# Patient Record
Sex: MALE | Race: WHITE
[De-identification: names, ages, dates, MRNs, and addresses within clinical notes are randomized per-mention and may not be internally consistent; named-entity substitution may affect disease eponyms.]

---

## 2017-08-20 ENCOUNTER — HOSPITAL ENCOUNTER (EMERGENCY)
Dept: HOSPITAL 25 - UCCORT | Age: 22
Discharge: HOME | End: 2017-08-20
Payer: COMMERCIAL

## 2017-08-20 VITALS — SYSTOLIC BLOOD PRESSURE: 119 MMHG | DIASTOLIC BLOOD PRESSURE: 66 MMHG

## 2017-08-20 DIAGNOSIS — Z48.01: ICD-10-CM

## 2017-08-20 DIAGNOSIS — Z85.72: ICD-10-CM

## 2017-08-20 DIAGNOSIS — S91.002D: Primary | ICD-10-CM

## 2017-08-20 DIAGNOSIS — Z77.22: ICD-10-CM

## 2017-08-20 DIAGNOSIS — Z88.1: ICD-10-CM
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
## 2017-08-20 PROCEDURE — G0463 HOSPITAL OUTPT CLINIC VISIT: HCPCS

## 2017-08-20 PROCEDURE — 99213 OFFICE O/P EST LOW 20 MIN: CPT

## 2017-08-20 NOTE — UC
Skin Complaint HPI





- HPI Summary


HPI Summary: 


per RN triage "pt had a wound debriedment done to the lateral aspect of his L 

ankle on wed 08/16/17.  Pt was supposed to change the dressing the next day but 

mis understood the instructions and waited until yesterday, consequently, the 

gauze is stuck to the wound.  Pt has tried soaking the area and has not had 

success. " Lesion started about 1 yr ago. h/o T cell lymphoma. Mom states she 

mis-read the wound care instructions and was supposed to change it the 

following day, but did not try until 4 days later.  She tried to remove 

yesterday but there was bleeding and it was painful and she stopped. She did 

not get in touch with Dr Tovar' covering group. There is no discharge, redness

, fevers or chills. taking hydrocodone for pain. has f/u with PA in 3 days.  

Mom soaked it all day yesterday w/o any relief. 








- History of Current Complaint


Chief Complaint: UCSkin


Time Seen by Provider: 08/20/17 11:27


Stated Complaint: GAUZE STUCK IN WOUND





- Allergy/Home Medications


Allergies/Adverse Reactions: 


 Allergies











Allergy/AdvReac Type Severity Reaction Status Date / Time


 


Clarithromycin [From Biaxin] Allergy Intermediate Hives Verified 08/20/17 11:37











Home Medications: 


 Home Medications





HYDROcodone/ACETAMIN 5-325 MG* [Norco 5-325 TAB*] 1 tab PO Q4H PRN 08/20/17 [

History Confirmed 08/20/17]











Review of Systems


Constitutional: Negative


Skin: Other - left lateral ankle wound


Eyes: Negative


ENT: Negative


Respiratory: Negative


Cardiovascular: Negative


Gastrointestinal: Negative


Genitourinary: Negative


Motor: Negative


Neurovascular: Negative


Musculoskeletal: Negative


Neurological: Negative


Psychological: Negative


All Other Systems Reviewed And Are Negative: Yes





PMH/Surg Hx/FS Hx/Imm Hx


Previously Healthy: Yes


Cancer History: Other - T cell lymphoma


Other Cancer History: T cell lymphoma





- Surgical History


Surgical History: Yes


Surgery Procedure, Year, and Place: BLOCKED TEAR DUCT  PORT PLACED 11/2005 AND 

REMOVED 2007





- Family History


Known Family History: Positive: Hypertension





- Social History


Alcohol Use: Occasionally


Alcohol Amount: 1X WEEK


Substance Use Type: None


Smoking Status (MU): Never Smoked Tobacco


Household Exposure Type: Cigarettes





- Immunization History


Most Recent Influenza Vaccination: has not had





Physical Exam


Triage Information Reviewed: Yes


Appearance: Well-Appearing - vague historians, No Pain Distress, Well-Nourished


Vital Signs: 


 Initial Vital Signs











Temp  99 F   08/20/17 11:29


 


Pulse  79   08/20/17 11:29


 


Resp  14   08/20/17 11:29


 


BP  126/69   08/20/17 11:29


 


Pulse Ox  100   08/20/17 11:29











Vital Signs Reviewed: Yes


Eye Exam: Normal


ENT Exam: Normal


Neck exam: Normal


Neck: Positive: Supple, Nontender, No Lymphadenopathy


Respiratory Exam: Normal


Respiratory: Positive: Lungs clear, Normal breath sounds, No respiratory 

distress


Cardiovascular Exam: Normal


Cardiovascular: Positive: RRR, No Murmur


Neurological Exam: Normal


Skin: Positive: Other - left lateral ankle with baseball sized open wound with 

gauze firmly embedded. there is no surrounding erythema, d/c or streaks.





Course/Dx





- Course


Course Of Treatment: wound cleanse in NS. gauze is fully removed very slowly 

and carefully. Pt tolerated well. He wanted it fully removed. no purulent d/c. 

good granulation tissue. wound dressing applied per Dr Tovar' instructions 

with non-stick dressing adn hyfrogel provided by Mom Ingred. They were very 

pleased for care





- Differential Diagnoses - Skin Complaint


Differential Diagnoses: Abscess, Other - wound





- Diagnoses


Provider Diagnoses: open wound, dressing change





Discharge





- Discharge Plan


Condition: Stable


Disposition: HOME


Patient Education Materials:  Acute Wound Care (ED)


Referrals: 


Micheal Urias MD [Primary Care Provider] - 


Additional Instructions: 


The wound care dressing has been completely removed today. there is no sign of 

infection. please continue wound care as instructed by Dr Tovar and keep your 

follow up with their office on 8/23. Call them sooner with any signs or 

symptoms of infection.

## 2019-06-08 ENCOUNTER — HOSPITAL ENCOUNTER (EMERGENCY)
Dept: HOSPITAL 25 - UCCORT | Age: 24
Discharge: HOME HEALTH | End: 2019-06-08
Payer: COMMERCIAL

## 2019-06-08 VITALS — DIASTOLIC BLOOD PRESSURE: 75 MMHG | SYSTOLIC BLOOD PRESSURE: 130 MMHG

## 2019-06-08 DIAGNOSIS — Z88.1: ICD-10-CM

## 2019-06-08 DIAGNOSIS — R10.30: Primary | ICD-10-CM

## 2019-06-08 PROCEDURE — G0463 HOSPITAL OUTPT CLINIC VISIT: HCPCS

## 2019-06-08 PROCEDURE — 99212 OFFICE O/P EST SF 10 MIN: CPT

## 2019-06-08 NOTE — UC
Abdominal Pain Male HPI





- HPI Summary


HPI Summary: 


23-year-old male presents with complaints of lower abdominal pain for the past 

6 days.  Describes pain as fairly constant.  Worsens with squatting or bending.

  Reports normal bowel movements.  Last BM this morning.  Denies fever, chills, 

nausea, vomiting, diarrhea, blood in stool, melena, dysuria, back or flank pain

, frequency, urgency, hematuria, or penile discharge.








- History of Current Complaint


Chief Complaint: UCAbdominalPain


Stated Complaint: ABDOMINAL PAIN


Time Seen by Provider: 06/08/19 13:53


Hx Obtained From: Patient


Pain Intensity: 5





- Allergies/Home Medications


Allergies/Adverse Reactions: 


 Allergies











Allergy/AdvReac Type Severity Reaction Status Date / Time


 


clarithromycin [From Biaxin] Allergy  Hives Verified 06/08/19 14:01














PMH/Surg Hx/FS Hx/Imm Hx


Previously Healthy: Yes


Endocrine History: Hypothyroidism





- Surgical History


Surgical History: Yes


Surgery Procedure, Year, and Place: BLOCKED TEAR DUCT  PORT PLACED 11/2005 AND 

REMOVED 2007





- Family History


Known Family History: Positive: Hypertension





- Social History


Occupation: Employed Full-time


Lives: With Family


Alcohol Use: Occasionally


Alcohol Amount: 1X WEEK


Substance Use Type: None


Smoking Status (MU): Never Smoked Tobacco


Household Exposure Type: Cigarettes





- Immunization History


Most Recent Influenza Vaccination: has not had





Review of Systems


All Other Systems Reviewed And Are Negative: Yes


Constitutional: Negative: Fever, Chills


Respiratory: Positive: Negative


Cardiovascular: Positive: Negative


Gastrointestinal: Positive: Abdominal Pain.  Negative: Vomiting, Diarrhea, 

Nausea


Genitourinary: Negative: Dysuria, Hematuria, Frequency, Urgency, Vaginal/Penile 

Discharge


Musculoskeletal: Positive: Negative


Neurological: Positive: Negative


Is Patient Immunocompromised?: No





Physical Exam





- Summary


Physical Exam Summary: 


GENERAL APPEARANCE: Well developed, well nourished, alert and cooperative, and 

appears to be in no acute distress.





CARDIAC: Normal S1 and S2. No S3, S4 or murmurs. Rhythm is regular. There is no 

peripheral edema, cyanosis or pallor. Extremities are warm and well perfused. 

Capillary refill is less than 2 seconds. Peripheral pulses intact.





LUNGS: Clear to auscultation without rales, rhonchi, wheezing or diminished 

breath sounds.





ABDOMEN: Positive bowel sounds. Soft, nondistended. Mild lower abdominal 

tenderness without guarding or rebound. No masses or hepatosplenomegally. No 

CVA tenderness.





MUSKULOSKELETAL: ROM intact to all extremities. No joint erythema or 

tenderness. Normal muscular development. Normal gait.





SKIN: Skin normal color, texture and turgor with no lesions or eruptions.





Triage Information Reviewed: Yes


Vital Signs: 


 Initial Vital Signs











Temp  98.8 F   06/08/19 13:52


 


Pulse  71   06/08/19 13:52


 


Resp  18   06/08/19 13:52


 


BP  130/75   06/08/19 13:52


 


Pulse Ox  100   06/08/19 13:52











Vital Signs Reviewed: Yes





Abd Pain Male Course/Dx





- Course


Course Of Treatment: 


23-year-old male presents with complaints of lower abdominal pain for the past 

6 days.  Describes pain as fairly constant.  Worsens with squatting or bending.

  Reports normal bowel movements.  Last BM this morning.  Denies fever, chills, 

nausea, vomiting, diarrhea, blood in stool, melena, dysuria, back or flank pain

, frequency, urgency, hematuria, or penile discharge.  Afebrile.  Vital signs 

stable.  Patient had positive bowel sounds, soft, nondistended abdomen with 

mild lower abdominal tenderness without guarding or rebound, no masses or 

hepatosplenomegally and no CVA tenderness. Discussed with patient that I cannot 

rule out the possibility of appendicitis or other acute pathology and am 

recommending that he be further evaluated in the ED at this time. Patient is 

agreeable to this and is electing to transport via private vehicle with his 

mother driving.








- Differential Dx/Clinical Impression


Differential Diagnosis/HQI/PQRI: Appendicitis, Ischemic Bowel, Urinary Tract 

Infection


Provider Diagnosis: 


 Acute abdominal pain








- Physician Notification/Consults


Discussed Patient Care With: CHAYA Royal - [facility] ED


Time Discussed With Above Provider: 14:30


Instructed by Provider To: MD Will See In ED





Discharge





- Sign-Out/Discharge


Documenting (check all that apply): Patient Departure


All imaging exams completed and their final reports reviewed: No Studies





- Discharge Plan


Condition: Stable


Disposition: HOME-RECOMMEND TO ED


Patient Education Materials:  Acute Abdominal Pain (ED)


Referrals: 


Micheal Urias MD [Primary Care Provider] - 


Additional Instructions: 


There is no clear cause for your abdominal pain and I cannot rule out the 

possibility of appendicitis especially with you having lower abdominal pain. I 

am recommending that you go to the emergency room now for further evaluation. 

Go directly to the emergency room from here. Do not eat or drink anything until 

you have been evaluated.





- Billing Disposition and Condition


Condition: STABLE


Disposition: Home-Recommend to ED

## 2019-11-23 ENCOUNTER — HOSPITAL ENCOUNTER (EMERGENCY)
Dept: HOSPITAL 25 - UCCORT | Age: 24
Discharge: HOME | End: 2019-11-23
Payer: COMMERCIAL

## 2019-11-23 VITALS — SYSTOLIC BLOOD PRESSURE: 122 MMHG | DIASTOLIC BLOOD PRESSURE: 74 MMHG

## 2019-11-23 DIAGNOSIS — J40: Primary | ICD-10-CM

## 2019-11-23 DIAGNOSIS — R19.7: ICD-10-CM

## 2019-11-23 DIAGNOSIS — Z88.1: ICD-10-CM

## 2019-11-23 PROCEDURE — G0463 HOSPITAL OUTPT CLINIC VISIT: HCPCS

## 2019-11-23 PROCEDURE — 99212 OFFICE O/P EST SF 10 MIN: CPT

## 2019-11-23 NOTE — UC
Throat Pain/Nasal Terry HPI





- HPI Summary


HPI Summary: 


24-year-old male comes in with a chief complaint of upper respiratory tract 

infection symptoms for 10 days.  Started with a runny nose and some tenderness 

chest now he has chest congestion and he's been producing yellow sputum.  Use 

over-the-counter medicines are not helping.  No recent fevers.  Started 

diarrhea yesterday.  Diarrhea is watery is no blood in it no abdominal pain.





- History of Current Complaint


Chief Complaint: UCGeneralIllness


Stated Complaint: CONGESTION DIARRHEA


Time Seen by Provider: 11/23/19 12:22


Pain Intensity: 0





- Allergies/Home Medications


Allergies/Adverse Reactions: 


 Allergies











Allergy/AdvReac Type Severity Reaction Status Date / Time


 


clarithromycin [From Biaxin] Allergy  Hives Verified 11/23/19 12:37














PMH/Surg Hx/FS Hx/Imm Hx


Previously Healthy: Yes


Endocrine History: Hypothyroidism





- Surgical History


Surgical History: Yes


Surgery Procedure, Year, and Place: BLOCKED TEAR DUCT  PORT PLACED 11/2005 AND 

REMOVED 2007





- Family History


Known Family History: Positive: Hypertension





- Social History


Alcohol Use: None


Alcohol Amount: 1X WEEK


Substance Use Type: None


Smoking Status (MU): Never Smoked Tobacco


Household Exposure Type: Cigarettes





- Immunization History


Most Recent Influenza Vaccination: has not had





Review of Systems


All Other Systems Reviewed And Are Negative: Yes


Constitutional: Positive: Other - SEE HPI


Skin: Positive: Negative


Eyes: Positive: Negative


ENT: Positive: Nasal Discharge, Sinus Congestion


Respiratory: Positive: Cough, Other - SEE HPI


Cardiovascular: Positive: Negative


Gastrointestinal: Positive: Diarrhea


Motor: Positive: Negative


Neurovascular: Positive: Negative


Musculoskeletal: Positive: Negative


Neurological: Positive: Negative


Psychological: Positive: Negative


Is Patient Immunocompromised?: No





Physical Exam


Triage Information Reviewed: Yes


Appearance: No Pain Distress, Well-Nourished, Ill-Appearing - MILD


Vital Signs: 


 Initial Vital Signs











Temp  99.4 F   11/23/19 12:31


 


Pulse  81   11/23/19 12:31


 


Resp  18   11/23/19 12:31


 


BP  122/74   11/23/19 12:31


 


Pulse Ox  100   11/23/19 12:31











Vital Signs Reviewed: Yes


Eye Exam: Normal


Eyes: Positive: Conjunctiva Clear


ENT: Positive: Pharynx normal, TMs normal


Neck: Positive: Supple


Respiratory: Positive: Lungs clear, Normal breath sounds, No respiratory 

distress


Cardiovascular: Positive: RRR


Musculoskeletal: Positive: Strength Intact, ROM Intact


Neurological: Positive: Alert, Muscle Tone Normal


Psychological: Positive: Normal Response To Family, Age Appropriate Behavior


Skin Exam: Normal





Throat Pain/Nasal Course/Dx





- Course


Course Of Treatment: 


Greater than 10 days of upper respiratory tract infection symptoms therefore we 

will treat with an antibiotic.  The diarrhea is most likely secondary to the 

upper respiratory tract infection.  He has no abdominal pain no blood in the 

diarrhea.  Follow-up primary care doctor if not completely improved get 

reevaluated sooner if worse any questions concerns.





- Differential Dx/Diagnosis


Provider Diagnosis: 


 Bronchitis, Diarrhea








Discharge ED





- Sign-Out/Discharge


Documenting (check all that apply): Patient Departure


All imaging exams completed and their final reports reviewed: No Studies





- Discharge Plan


Condition: Stable


Disposition: HOME


Prescriptions: 


Amoxicillin PO (*) [Amoxicillin 875 MG (*)] 875 mg PO BID #20 tab


Patient Education Materials:  Acute Bronchitis (ED), Acute Diarrhea (ED)


Forms:  *Work Release


Referrals: 


Micheal Urias MD [Primary Care Provider] - 


Additional Instructions: 


FOLLOW UP WITH YOUR DOCTOR IF NOT COMPLETELY IMPROVED.


GET REEVALUATED SOONER IF NOT IMPROVING OR WORSE OR ANY QUESTIONS OR CONCERNS.











- Billing Disposition and Condition


Condition: STABLE


Disposition: Home

## 2020-03-07 ENCOUNTER — HOSPITAL ENCOUNTER (EMERGENCY)
Dept: HOSPITAL 25 - UCCORT | Age: 25
Discharge: HOME | End: 2020-03-07
Payer: COMMERCIAL

## 2020-03-07 VITALS — SYSTOLIC BLOOD PRESSURE: 115 MMHG | DIASTOLIC BLOOD PRESSURE: 72 MMHG

## 2020-03-07 DIAGNOSIS — Z79.890: ICD-10-CM

## 2020-03-07 DIAGNOSIS — Z88.1: ICD-10-CM

## 2020-03-07 DIAGNOSIS — E07.9: ICD-10-CM

## 2020-03-07 DIAGNOSIS — J32.9: Primary | ICD-10-CM

## 2020-03-07 PROCEDURE — G0463 HOSPITAL OUTPT CLINIC VISIT: HCPCS

## 2020-03-07 PROCEDURE — 99212 OFFICE O/P EST SF 10 MIN: CPT

## 2020-03-07 NOTE — UC
Respiratory Complaint HPI





- HPI Summary


HPI Summary: 





24-year-old male who has had head and chest congestion for 10 days now.  Today 

he complains of postnasal drainage and sinus pressure and states he coughs up 

greenish sputum.





- History of Current Complaint


Chief Complaint: UCRespiratory


Stated Complaint: CONGESTION/HA


Time Seen by Provider: 03/07/20 10:58


Hx Obtained From: Patient


Onset/Duration: Gradual Onset, Lasting Days


Timing: Intermittent Episodes


Severity Initially: Mild


Severity Currently: Mild


Pain Intensity: 5


Character: Cough: Productive - Occasional productive cough of greenish sputum


Alleviating Factors: Bronchodilator - The patient was seen at [facility] walk-

in clinic yesterday and given an albuterol inhaler which he only used one time 

yesterday but states that has not helped him.


Associated Signs And Symptoms: Positive: URI, Nasal Congestion, Sinus Discomfort





- Allergies/Home Medications


Allergies/Adverse Reactions: 


 Allergies











Allergy/AdvReac Type Severity Reaction Status Date / Time


 


clarithromycin [From Biaxin] Allergy  Hives Verified 03/07/20 11:01











Home Medications: 


 Home Medications





Levothyroxine TAB* [Synthroid TAB*] 125 mcg PO DAILY 09/11/19 [History 

Confirmed 03/07/20]


Albuterol HFA INHALER* [Ventolin HFA Inhaler*] 1 - 2 puff INH Q4H PRN 03/07/20 [

History Confirmed 03/07/20]


Amoxicillin/Clavulanate TAB* [Augmentin *] 875 mg PO BID 10 Days #20 tab 

03/07/20 [Rx]











PMH/Surg Hx/FS Hx/Imm Hx


Previously Healthy: Yes


Endocrine History: Thyroid Disease





- Surgical History


Surgical History: Yes


Surgery Procedure, Year, and Place: BLOCKED TEAR DUCT  PORT PLACED 11/2005 AND 

REMOVED 2007.  L ankle surgery for wound issue.  vascular surgery to L leg.





- Family History


Known Family History: Positive: Hypertension





- Social History


Lives: With Family


Alcohol Use: None


Alcohol Amount: 1X WEEK


Substance Use Type: None


Smoking Status (MU): Never Smoked Tobacco


Household Exposure Type: Cigarettes





- Immunization History


Most Recent Influenza Vaccination: has not had





Review of Systems


All Other Systems Reviewed And Are Negative: Yes


ENT: Positive: Nasal Discharge, Sinus Congestion, Sinus Pain/Tenderness


Respiratory: Positive: Cough


Is Patient Immunocompromised?: No





Physical Exam


Triage Information Reviewed: Yes


Appearance: Well-Appearing, No Pain Distress, Well-Nourished


Vital Signs: 


 Initial Vital Signs











Temp  98.5 F   03/07/20 11:02


 


Pulse  72   03/07/20 11:02


 


Resp  16   03/07/20 11:02


 


BP  115/72   03/07/20 11:02


 


Pulse Ox  100   03/07/20 11:02











Vital Signs Reviewed: Yes


Eyes: Positive: Conjunctiva Clear


ENT: Positive: Pharynx normal - Yellow postnasal drainage., Nasal congestion, 

Nasal drainage - Yellow purulent nasal coryza., TMs normal, Sinus tenderness - 

Tenderness over the maxillary sinuses bilaterally., Uvula midline


Neck: Positive: Supple, Nontender, No Lymphadenopathy


Respiratory: Positive: Lungs clear, Normal breath sounds, No accessory muscle 

use


Cardiovascular: Positive: RRR, No Murmur, Pulses Normal, Brisk Capillary Refill


Musculoskeletal Exam: Normal


Neurological Exam: Normal


Psychological Exam: Normal


Skin Exam: Normal





Respiratory Course/Dx





- Course


Course Of Treatment: 





The patient is comfortable here and in no distress.  He was advised to continue 

the albuterol inhaler 2 puffs every 4-6 hours as needed for any tight cough.  

He was also advised to take the Augmentin with food.





- Differential Dx/Diagnosis


Provider Diagnosis: 


 Sinusitis








Discharge ED





- Sign-Out/Discharge


Documenting (check all that apply): Patient Departure


All imaging exams completed and their final reports reviewed: No Studies





- Discharge Plan


Condition: Good


Disposition: HOME


Prescriptions: 


Amoxicillin/Clavulanate TAB* [Augmentin *] 875 mg PO BID 10 Days #20 tab


Patient Education Materials:  Sinusitis (ED)


Referrals: 


Micheal Urias MD [Primary Care Provider] - 


Additional Instructions: 


Increase fluids, over-the-counter decongestant as directed.  Take the Augmentin 

with food.  Follow-up with your primary care provider in 5-7 days if no 

improvement.





- Billing Disposition and Condition


Condition: GOOD


Disposition: Home